# Patient Record
Sex: FEMALE | Race: BLACK OR AFRICAN AMERICAN | NOT HISPANIC OR LATINO | URBAN - METROPOLITAN AREA
[De-identification: names, ages, dates, MRNs, and addresses within clinical notes are randomized per-mention and may not be internally consistent; named-entity substitution may affect disease eponyms.]

---

## 2017-05-16 ENCOUNTER — TELEPHONE (OUTPATIENT)
Dept: OBSTETRICS AND GYNECOLOGY | Facility: CLINIC | Age: 27
End: 2017-05-16

## 2017-05-16 NOTE — TELEPHONE ENCOUNTER
----- Message from Ashly Wick sent at 5/16/2017  9:30 AM CDT -----  Contact: self  Pt called in about wanting to schedule another appt. Pt would like to sooner appt than 5/22/17 she had to cancel that one because she couldn't make it and did want to rescheduled with me would like the nurse to call her back to discuss further.    Pt can be reached at [phone].

## 2017-05-16 NOTE — TELEPHONE ENCOUNTER
Left message to patient to call the office at [phone] retuning call about scheduling an appointment.

## 2017-05-16 NOTE — TELEPHONE ENCOUNTER
----- Message from Yanely Moeller sent at 5/16/2017 10:11 AM CDT -----  Contact: YESICA EDMONDSON [[id number]]  _  1st Request  _  2nd Request  _  3rd Request        Who: YESICA EDMONDSON [[id number]]     Why: pt is returning clinical staff phone call, Thanks!    What Number to Call Back: [phone]    When to Expect a call back: (Before the end of the day)   -- if the call is after 12:00, the call back will be tomorrow.

## 2017-05-17 ENCOUNTER — TELEPHONE (OUTPATIENT)
Dept: OBSTETRICS AND GYNECOLOGY | Facility: CLINIC | Age: 27
End: 2017-05-17

## 2017-05-17 NOTE — TELEPHONE ENCOUNTER
Returned pt call regarding rescheduling her annual exam.  Offered pt multiple appts.  Pt declined and stated that she needs to be seen on a Monday or Friday.  Offer pt Monday or Friday appts.  Pt declined and stated she will wait until July opens.  Informed pt that a reminder will be made to give her a call when the July schedule opens.  Pt verbalized understanding.

## 2017-05-23 ENCOUNTER — TELEPHONE (OUTPATIENT)
Dept: OBSTETRICS AND GYNECOLOGY | Facility: CLINIC | Age: 27
End: 2017-05-23

## 2017-05-23 NOTE — TELEPHONE ENCOUNTER
Called pt to schedule her annual for July.  Pt requested July 10th.  Informed pt that Mondays are not available at this moment and that she will receive a call back regarding this date.  Pt verbalized understanding.

## 2017-05-23 NOTE — TELEPHONE ENCOUNTER
----- Message from Leticia Jimenez LPN sent at 5/17/2017  9:33 AM CDT -----  Call pt to schedule annual when July schedule opens.

## 2017-06-05 ENCOUNTER — TELEPHONE (OUTPATIENT)
Dept: OBSTETRICS AND GYNECOLOGY | Facility: CLINIC | Age: 27
End: 2017-06-05

## 2017-06-05 NOTE — TELEPHONE ENCOUNTER
----- Message from Leticia Jimenez LPN sent at 5/23/2017  5:15 PM CDT -----  Call pt to schedule annual.  Pt request 7/10

## 2017-06-05 NOTE — TELEPHONE ENCOUNTER
Called pt to schedule her annual on 7/10 per pt request.  No answer.  Left VM message to return call to clinic.

## 2017-06-05 NOTE — TELEPHONE ENCOUNTER
----- Message from Aria Grier sent at 6/5/2017  3:09 PM CDT -----  Contact: self [phone]  Pt returning a missed call, she can be reached at [phone].

## 2017-06-05 NOTE — TELEPHONE ENCOUNTER
Returned pt call regarding scheduling her annual.  Informed pt of appt date and time.  Pt verbalized understanding.  Letter sent out.

## 2017-06-28 ENCOUNTER — TELEPHONE (OUTPATIENT)
Dept: OBSTETRICS AND GYNECOLOGY | Facility: CLINIC | Age: 27
End: 2017-06-28

## 2017-06-28 NOTE — TELEPHONE ENCOUNTER
Called pt to reschedule her appt to the am of 8/14 due to Dr Griffiths not being available at the provided time.  Informed pt of appt date and time.  Pt verbalized understanding.  Letter sent out.

## 2021-11-08 ENCOUNTER — TELEPHONE (OUTPATIENT)
Dept: OPTOMETRY | Facility: CLINIC | Age: 31
End: 2021-11-08
Payer: COMMERCIAL